# Patient Record
Sex: FEMALE | Race: BLACK OR AFRICAN AMERICAN | NOT HISPANIC OR LATINO | URBAN - METROPOLITAN AREA
[De-identification: names, ages, dates, MRNs, and addresses within clinical notes are randomized per-mention and may not be internally consistent; named-entity substitution may affect disease eponyms.]

---

## 2020-06-19 ENCOUNTER — INPATIENT (INPATIENT)
Facility: HOSPITAL | Age: 25
LOS: 1 days | Discharge: HOME | End: 2020-06-21
Attending: OBSTETRICS & GYNECOLOGY | Admitting: OBSTETRICS & GYNECOLOGY
Payer: MEDICAID

## 2020-06-19 DIAGNOSIS — O26.899 OTHER SPECIFIED PREGNANCY RELATED CONDITIONS, UNSPECIFIED TRIMESTER: ICD-10-CM

## 2020-06-19 DIAGNOSIS — Z34.80 ENCOUNTER FOR SUPERVISION OF OTHER NORMAL PREGNANCY, UNSPECIFIED TRIMESTER: ICD-10-CM

## 2020-06-19 DIAGNOSIS — Z3A.00 WEEKS OF GESTATION OF PREGNANCY NOT SPECIFIED: ICD-10-CM

## 2020-06-19 LAB
ALBUMIN SERPL ELPH-MCNC: 3.4 G/DL — SIGNIFICANT CHANGE UP (ref 3.3–5)
ALP SERPL-CCNC: 119 U/L — SIGNIFICANT CHANGE UP (ref 40–120)
ALT FLD-CCNC: 12 U/L — SIGNIFICANT CHANGE UP (ref 10–45)
ANION GAP SERPL CALC-SCNC: 12 MMOL/L — SIGNIFICANT CHANGE UP (ref 5–17)
APPEARANCE UR: CLEAR — SIGNIFICANT CHANGE UP
APTT BLD: 24.4 SEC — LOW (ref 27.5–36.3)
AST SERPL-CCNC: 14 U/L — SIGNIFICANT CHANGE UP (ref 10–40)
BACTERIA # UR AUTO: NEGATIVE — SIGNIFICANT CHANGE UP
BASOPHILS # BLD AUTO: 0 K/UL — SIGNIFICANT CHANGE UP (ref 0–0.2)
BASOPHILS NFR BLD AUTO: 0 % — SIGNIFICANT CHANGE UP (ref 0–2)
BILIRUB SERPL-MCNC: 0.2 MG/DL — SIGNIFICANT CHANGE UP (ref 0.2–1.2)
BILIRUB UR-MCNC: NEGATIVE — SIGNIFICANT CHANGE UP
BLD GP AB SCN SERPL QL: NEGATIVE — SIGNIFICANT CHANGE UP
BUN SERPL-MCNC: 9 MG/DL — SIGNIFICANT CHANGE UP (ref 7–23)
CALCIUM SERPL-MCNC: 9.5 MG/DL — SIGNIFICANT CHANGE UP (ref 8.4–10.5)
CHLORIDE SERPL-SCNC: 100 MMOL/L — SIGNIFICANT CHANGE UP (ref 96–108)
CO2 SERPL-SCNC: 22 MMOL/L — SIGNIFICANT CHANGE UP (ref 22–31)
COLOR SPEC: SIGNIFICANT CHANGE UP
CREAT ?TM UR-MCNC: 95 MG/DL — SIGNIFICANT CHANGE UP
CREAT SERPL-MCNC: 0.74 MG/DL — SIGNIFICANT CHANGE UP (ref 0.5–1.3)
DIFF PNL FLD: ABNORMAL
EOSINOPHIL # BLD AUTO: 0 K/UL — SIGNIFICANT CHANGE UP (ref 0–0.5)
EOSINOPHIL NFR BLD AUTO: 0 % — SIGNIFICANT CHANGE UP (ref 0–6)
EPI CELLS # UR: 4 /HPF — SIGNIFICANT CHANGE UP
FIBRINOGEN PPP-MCNC: 611 MG/DL — HIGH (ref 350–510)
GIANT PLATELETS BLD QL SMEAR: PRESENT — SIGNIFICANT CHANGE UP
GLUCOSE SERPL-MCNC: 107 MG/DL — HIGH (ref 70–99)
GLUCOSE UR QL: NEGATIVE — SIGNIFICANT CHANGE UP
HCT VFR BLD CALC: 33.8 % — LOW (ref 34.5–45)
HGB BLD-MCNC: 10.3 G/DL — LOW (ref 11.5–15.5)
HYALINE CASTS # UR AUTO: 1 /LPF — SIGNIFICANT CHANGE UP (ref 0–2)
INR BLD: 0.97 RATIO — SIGNIFICANT CHANGE UP (ref 0.88–1.16)
KETONES UR-MCNC: NEGATIVE — SIGNIFICANT CHANGE UP
LDH SERPL L TO P-CCNC: 174 U/L — SIGNIFICANT CHANGE UP (ref 50–242)
LEUKOCYTE ESTERASE UR-ACNC: ABNORMAL
LG PLATELETS BLD QL AUTO: SLIGHT — SIGNIFICANT CHANGE UP
LYMPHOCYTES # BLD AUTO: 1.19 K/UL — SIGNIFICANT CHANGE UP (ref 1–3.3)
LYMPHOCYTES # BLD AUTO: 9 % — LOW (ref 13–44)
MANUAL SMEAR VERIFICATION: SIGNIFICANT CHANGE UP
MCHC RBC-ENTMCNC: 24.8 PG — LOW (ref 27–34)
MCHC RBC-ENTMCNC: 30.5 GM/DL — LOW (ref 32–36)
MCV RBC AUTO: 81.3 FL — SIGNIFICANT CHANGE UP (ref 80–100)
MONOCYTES # BLD AUTO: 0.93 K/UL — HIGH (ref 0–0.9)
MONOCYTES NFR BLD AUTO: 7 % — SIGNIFICANT CHANGE UP (ref 2–14)
NEUTROPHILS # BLD AUTO: 10.87 K/UL — HIGH (ref 1.8–7.4)
NEUTROPHILS NFR BLD AUTO: 81 % — HIGH (ref 43–77)
NEUTS BAND # BLD: 1 % — SIGNIFICANT CHANGE UP (ref 0–8)
NITRITE UR-MCNC: NEGATIVE — SIGNIFICANT CHANGE UP
NRBC # BLD: 0 /100 — SIGNIFICANT CHANGE UP (ref 0–0)
PH UR: 6.5 — SIGNIFICANT CHANGE UP (ref 5–8)
PLAT MORPH BLD: ABNORMAL
PLATELET # BLD AUTO: 276 K/UL — SIGNIFICANT CHANGE UP (ref 150–400)
POTASSIUM SERPL-MCNC: 4.2 MMOL/L — SIGNIFICANT CHANGE UP (ref 3.5–5.3)
POTASSIUM SERPL-SCNC: 4.2 MMOL/L — SIGNIFICANT CHANGE UP (ref 3.5–5.3)
PROT ?TM UR-MCNC: 33 MG/DL — HIGH (ref 0–12)
PROT SERPL-MCNC: 6.7 G/DL — SIGNIFICANT CHANGE UP (ref 6–8.3)
PROT UR-MCNC: ABNORMAL
PROT/CREAT UR-RTO: 0.3 RATIO — HIGH (ref 0–0.2)
PROTHROM AB SERPL-ACNC: 11.1 SEC — SIGNIFICANT CHANGE UP (ref 10–12.9)
RBC # BLD: 4.16 M/UL — SIGNIFICANT CHANGE UP (ref 3.8–5.2)
RBC # FLD: 14.8 % — HIGH (ref 10.3–14.5)
RBC BLD AUTO: SIGNIFICANT CHANGE UP
RBC CASTS # UR COMP ASSIST: 37 /HPF — HIGH (ref 0–4)
RH IG SCN BLD-IMP: POSITIVE — SIGNIFICANT CHANGE UP
RH IG SCN BLD-IMP: POSITIVE — SIGNIFICANT CHANGE UP
SARS-COV-2 RNA SPEC QL NAA+PROBE: SIGNIFICANT CHANGE UP
SODIUM SERPL-SCNC: 134 MMOL/L — LOW (ref 135–145)
SP GR SPEC: 1.01 — SIGNIFICANT CHANGE UP (ref 1.01–1.02)
URATE SERPL-MCNC: 4.7 MG/DL — SIGNIFICANT CHANGE UP (ref 2.5–7)
UROBILINOGEN FLD QL: NEGATIVE — SIGNIFICANT CHANGE UP
VARIANT LYMPHS # BLD: 2 % — SIGNIFICANT CHANGE UP (ref 0–6)
WBC # BLD: 13.25 K/UL — HIGH (ref 3.8–10.5)
WBC # FLD AUTO: 13.25 K/UL — HIGH (ref 3.8–10.5)
WBC UR QL: 6 /HPF — HIGH (ref 0–5)

## 2020-06-19 RX ORDER — CITRIC ACID/SODIUM CITRATE 300-500 MG
15 SOLUTION, ORAL ORAL EVERY 6 HOURS
Refills: 0 | Status: DISCONTINUED | OUTPATIENT
Start: 2020-06-19 | End: 2020-06-20

## 2020-06-19 RX ORDER — OXYTOCIN 10 UNIT/ML
2 VIAL (ML) INJECTION
Qty: 30 | Refills: 0 | Status: DISCONTINUED | OUTPATIENT
Start: 2020-06-19 | End: 2020-06-21

## 2020-06-19 RX ORDER — OXYTOCIN 10 UNIT/ML
333.33 VIAL (ML) INJECTION
Qty: 20 | Refills: 0 | Status: DISCONTINUED | OUTPATIENT
Start: 2020-06-19 | End: 2020-06-21

## 2020-06-19 RX ORDER — SODIUM CHLORIDE 9 MG/ML
1000 INJECTION, SOLUTION INTRAVENOUS
Refills: 0 | Status: DISCONTINUED | OUTPATIENT
Start: 2020-06-19 | End: 2020-06-20

## 2020-06-20 VITALS — WEIGHT: 293 LBS | HEIGHT: 62 IN

## 2020-06-20 PROCEDURE — 59409 OBSTETRICAL CARE: CPT | Mod: U9

## 2020-06-20 RX ORDER — IBUPROFEN 200 MG
600 TABLET ORAL EVERY 6 HOURS
Refills: 0 | Status: COMPLETED | OUTPATIENT
Start: 2020-06-20 | End: 2021-05-19

## 2020-06-20 RX ORDER — DIPHENHYDRAMINE HCL 50 MG
25 CAPSULE ORAL EVERY 6 HOURS
Refills: 0 | Status: DISCONTINUED | OUTPATIENT
Start: 2020-06-20 | End: 2020-06-21

## 2020-06-20 RX ORDER — HYDROCORTISONE 1 %
1 OINTMENT (GRAM) TOPICAL EVERY 6 HOURS
Refills: 0 | Status: DISCONTINUED | OUTPATIENT
Start: 2020-06-20 | End: 2020-06-21

## 2020-06-20 RX ORDER — LANOLIN
1 OINTMENT (GRAM) TOPICAL EVERY 6 HOURS
Refills: 0 | Status: DISCONTINUED | OUTPATIENT
Start: 2020-06-20 | End: 2020-06-21

## 2020-06-20 RX ORDER — OXYCODONE HYDROCHLORIDE 5 MG/1
5 TABLET ORAL
Refills: 0 | Status: DISCONTINUED | OUTPATIENT
Start: 2020-06-20 | End: 2020-06-21

## 2020-06-20 RX ORDER — DIBUCAINE 1 %
1 OINTMENT (GRAM) RECTAL EVERY 6 HOURS
Refills: 0 | Status: DISCONTINUED | OUTPATIENT
Start: 2020-06-20 | End: 2020-06-21

## 2020-06-20 RX ORDER — BENZOCAINE 10 %
1 GEL (GRAM) MUCOUS MEMBRANE EVERY 6 HOURS
Refills: 0 | Status: DISCONTINUED | OUTPATIENT
Start: 2020-06-20 | End: 2020-06-21

## 2020-06-20 RX ORDER — SODIUM CHLORIDE 9 MG/ML
3 INJECTION INTRAMUSCULAR; INTRAVENOUS; SUBCUTANEOUS EVERY 8 HOURS
Refills: 0 | Status: DISCONTINUED | OUTPATIENT
Start: 2020-06-20 | End: 2020-06-21

## 2020-06-20 RX ORDER — OXYCODONE HYDROCHLORIDE 5 MG/1
5 TABLET ORAL ONCE
Refills: 0 | Status: DISCONTINUED | OUTPATIENT
Start: 2020-06-20 | End: 2020-06-21

## 2020-06-20 RX ORDER — MAGNESIUM HYDROXIDE 400 MG/1
30 TABLET, CHEWABLE ORAL
Refills: 0 | Status: DISCONTINUED | OUTPATIENT
Start: 2020-06-20 | End: 2020-06-21

## 2020-06-20 RX ORDER — ACETAMINOPHEN 500 MG
975 TABLET ORAL
Refills: 0 | Status: DISCONTINUED | OUTPATIENT
Start: 2020-06-20 | End: 2020-06-21

## 2020-06-20 RX ORDER — SIMETHICONE 80 MG/1
80 TABLET, CHEWABLE ORAL EVERY 4 HOURS
Refills: 0 | Status: DISCONTINUED | OUTPATIENT
Start: 2020-06-20 | End: 2020-06-21

## 2020-06-20 RX ORDER — PRAMOXINE HYDROCHLORIDE 150 MG/15G
1 AEROSOL, FOAM RECTAL EVERY 4 HOURS
Refills: 0 | Status: DISCONTINUED | OUTPATIENT
Start: 2020-06-20 | End: 2020-06-21

## 2020-06-20 RX ORDER — KETOROLAC TROMETHAMINE 30 MG/ML
30 SYRINGE (ML) INJECTION ONCE
Refills: 0 | Status: DISCONTINUED | OUTPATIENT
Start: 2020-06-20 | End: 2020-06-20

## 2020-06-20 RX ORDER — OXYTOCIN 10 UNIT/ML
333.33 VIAL (ML) INJECTION
Qty: 20 | Refills: 0 | Status: DISCONTINUED | OUTPATIENT
Start: 2020-06-20 | End: 2020-06-21

## 2020-06-20 RX ORDER — IBUPROFEN 200 MG
600 TABLET ORAL EVERY 6 HOURS
Refills: 0 | Status: DISCONTINUED | OUTPATIENT
Start: 2020-06-20 | End: 2020-06-21

## 2020-06-20 RX ORDER — AER TRAVELER 0.5 G/1
1 SOLUTION RECTAL; TOPICAL EVERY 4 HOURS
Refills: 0 | Status: DISCONTINUED | OUTPATIENT
Start: 2020-06-20 | End: 2020-06-21

## 2020-06-20 RX ORDER — TETANUS TOXOID, REDUCED DIPHTHERIA TOXOID AND ACELLULAR PERTUSSIS VACCINE, ADSORBED 5; 2.5; 8; 8; 2.5 [IU]/.5ML; [IU]/.5ML; UG/.5ML; UG/.5ML; UG/.5ML
0.5 SUSPENSION INTRAMUSCULAR ONCE
Refills: 0 | Status: DISCONTINUED | OUTPATIENT
Start: 2020-06-20 | End: 2020-06-21

## 2020-06-20 RX ADMIN — Medication 600 MILLIGRAM(S): at 17:24

## 2020-06-20 RX ADMIN — Medication 30 MILLIGRAM(S): at 08:51

## 2020-06-20 RX ADMIN — Medication 600 MILLIGRAM(S): at 23:30

## 2020-06-20 RX ADMIN — Medication 975 MILLIGRAM(S): at 21:32

## 2020-06-20 RX ADMIN — Medication 975 MILLIGRAM(S): at 14:42

## 2020-06-20 NOTE — PATIENT PROFILE OB - VISION (WITH CORRECTIVE LENSES IF THE PATIENT USUALLY WEARS THEM):
Your opinion matters! Thank you for choosing the [facility]. You might receive a survey in the mail about your experience today to evaluate our clinic. Please fill it out and return it in the pre-paid envelope.  It was a pleasure to care for you today!     Test Results:  Our goal is to report all test results ordered by our care provider within 7-14 days. If you do not receive your test results either by phone, myaurora or by mail within 14 days after your visit with our office please call our office at [phone] and ask to speak with a member of our care team.         Normal vision: sees adequately in most situations; can see medication labels, newsprint

## 2020-06-21 VITALS
SYSTOLIC BLOOD PRESSURE: 124 MMHG | DIASTOLIC BLOOD PRESSURE: 79 MMHG | HEART RATE: 98 BPM | OXYGEN SATURATION: 98 % | RESPIRATION RATE: 18 BRPM | TEMPERATURE: 98 F

## 2020-06-21 PROCEDURE — 85384 FIBRINOGEN ACTIVITY: CPT

## 2020-06-21 PROCEDURE — 81001 URINALYSIS AUTO W/SCOPE: CPT

## 2020-06-21 PROCEDURE — 87522 HEPATITIS C REVRS TRNSCRPJ: CPT

## 2020-06-21 PROCEDURE — 59025 FETAL NON-STRESS TEST: CPT

## 2020-06-21 PROCEDURE — 82570 ASSAY OF URINE CREATININE: CPT

## 2020-06-21 PROCEDURE — G0463: CPT

## 2020-06-21 PROCEDURE — 86900 BLOOD TYPING SEROLOGIC ABO: CPT

## 2020-06-21 PROCEDURE — 85610 PROTHROMBIN TIME: CPT

## 2020-06-21 PROCEDURE — 84156 ASSAY OF PROTEIN URINE: CPT

## 2020-06-21 PROCEDURE — 80053 COMPREHEN METABOLIC PANEL: CPT

## 2020-06-21 PROCEDURE — 83615 LACTATE (LD) (LDH) ENZYME: CPT

## 2020-06-21 PROCEDURE — 59050 FETAL MONITOR W/REPORT: CPT

## 2020-06-21 PROCEDURE — 86901 BLOOD TYPING SEROLOGIC RH(D): CPT

## 2020-06-21 PROCEDURE — 86850 RBC ANTIBODY SCREEN: CPT

## 2020-06-21 PROCEDURE — 85027 COMPLETE CBC AUTOMATED: CPT

## 2020-06-21 PROCEDURE — 85730 THROMBOPLASTIN TIME PARTIAL: CPT

## 2020-06-21 PROCEDURE — 84550 ASSAY OF BLOOD/URIC ACID: CPT

## 2020-06-21 RX ORDER — ACETAMINOPHEN 500 MG
3 TABLET ORAL
Qty: 0 | Refills: 0 | DISCHARGE
Start: 2020-06-21

## 2020-06-21 RX ORDER — IBUPROFEN 200 MG
1 TABLET ORAL
Qty: 0 | Refills: 0 | DISCHARGE
Start: 2020-06-21

## 2020-06-21 RX ADMIN — Medication 975 MILLIGRAM(S): at 03:03

## 2020-06-21 RX ADMIN — Medication 600 MILLIGRAM(S): at 05:41

## 2020-06-21 RX ADMIN — Medication 600 MILLIGRAM(S): at 12:00

## 2020-06-21 RX ADMIN — Medication 975 MILLIGRAM(S): at 15:43

## 2020-06-21 RX ADMIN — Medication 975 MILLIGRAM(S): at 08:46

## 2020-06-21 RX ADMIN — Medication 600 MILLIGRAM(S): at 18:08

## 2020-06-21 RX ADMIN — Medication 975 MILLIGRAM(S): at 21:00

## 2020-06-21 NOTE — PROGRESS NOTE ADULT - SUBJECTIVE AND OBJECTIVE BOX
OB Progress Note:  PPD#1    S: 25yo  PPD#1 s/p . Patient feels well. Pain is well controlled. She is tolerating a regular diet and passing flatus. She is voiding spontaneously, and ambulating without difficulty. Denies CP/SOB. Denies lightheadedness/dizziness. Denies N/V. Denies heavy vaginal bleeding, would like to use condoms for contraception.     O:  Vitals:  Vital Signs Last 24 Hrs  T(C): 36.6 (2020 04:58), Max: 37.1 (2020 08:55)  T(F): 97.8 (2020 04:58), Max: 98.8 (2020 08:55)  HR: 90 (2020 04:58) (76 - 149)  BP: 101/68 (2020 04:58) (101/68 - 156/83)  BP(mean): 95 (2020 08:55) (95 - 101)  RR: 18 (2020 04:58) (17 - 18)  SpO2: 98% (2020 04:58) (98% - 99%)    MEDICATIONS  (STANDING):  acetaminophen   Tablet .. 975 milliGRAM(s) Oral <User Schedule>  diphtheria/tetanus/pertussis (acellular) Vaccine (ADAcel) 0.5 milliLiter(s) IntraMuscular once  ibuprofen  Tablet. 600 milliGRAM(s) Oral every 6 hours  oxytocin Infusion 333.333 milliUNIT(s)/Min (1000 mL/Hr) IV Continuous <Continuous>  oxytocin Infusion 333.333 milliUNIT(s)/Min (1000 mL/Hr) IV Continuous <Continuous>  oxytocin Infusion 2 milliUNIT(s)/Min (2 mL/Hr) IV Continuous <Continuous>  prenatal multivitamin 1 Tablet(s) Oral daily  sodium chloride 0.9% lock flush 3 milliLiter(s) IV Push every 8 hours      Labs:  Blood type: O Positive  Rubella IgG: RPR:                           10.3<L>   13.25<H> >-----------< 276    (  @ 20:24 )             33.8<L>    - @ 20:24              Physical Exam:  General: NAD  Abdomen: soft, non-tender, non-distended, fundus firm  Vaginal: Lochia wnl  Extremities: No erythema/edema

## 2020-06-21 NOTE — DISCHARGE NOTE OB - PROVIDER TOKENS
FREE:[LAST:[[facility]],PHONE:[(   )    -],FAX:[(   )    -],ADDRESS:[[address] [place] [address] [address] [place] [address]  [phone]]]

## 2020-06-21 NOTE — PROGRESS NOTE ADULT - PROBLEM SELECTOR PLAN 1
- Pain well controlled, continue current pain regimen  - Increase ambulation, SCDs when not ambulating  - Continue regular diet    Jordin Peñaloza PGY1

## 2020-06-21 NOTE — DISCHARGE NOTE OB - MEDICATION SUMMARY - MEDICATIONS TO TAKE
I will START or STAY ON the medications listed below when I get home from the hospital:    Electronic blood pressure cuff   -- Apply on skin to affected area 2 times a day   -- Indication: For blood pressure monitoring    acetaminophen 325 mg oral tablet  -- 3 tab(s) by mouth   -- Indication: For pain    ibuprofen 600 mg oral tablet  -- 1 tab(s) by mouth every 6 hours  -- Indication: For pain

## 2020-06-21 NOTE — DISCHARGE NOTE OB - PLAN OF CARE
Recovery After discharge, please stay on pelvic rest for 6 weeks, meaning no sexual intercourse, no tampons and no douching.  No driving for 2 weeks as women can loose a lot of blood during delivery and there is a possibility of being lightheaded/fainting.  No lifting objects heavier than baby for two weeks.  Expect to have vaginal bleeding/spotting for up to six weeks.  The bleeding should get lighter and more white/light brown with time.  For bleeding soaking more than a pad an hour or passing clots greater than the size of your fist, come in to the emergency department. Blood pressure monitoring Continue to take your blood pressure at least twice a day, and take your medications as prescribed.  Call your OB if your pressures are greater than 150/100, or if you experience severe or persistent headaches, visual changes, persistent nausea/vomiting, trouble breathing, chest pain, or severe abdominal pain.

## 2020-06-21 NOTE — DISCHARGE NOTE OB - CARE PROVIDER_API CALL
Pulmonary Edema No Effusion/Grossly Normal Function [facility],   [address] [place] [address] [address]  [place] [address]  [phone]  Phone: (   )    -  Fax: (   )    -  Follow Up Time:

## 2020-06-21 NOTE — DISCHARGE NOTE OB - PATIENT PORTAL LINK FT
You can access the FollowMyHealth Patient Portal offered by [facility] by registering at the following website: [email or web address]. By joining [employer]’s FollowMyHealth portal, you will also be able to view your health information using other applications (apps) compatible with our system.

## 2020-06-21 NOTE — PROGRESS NOTE ADULT - ATTENDING COMMENTS
Att on Call  Pt seen and examined.  Feeling well  VSS afeb  Abd S NT ND FF min lochia  S/p , stable for dc home  Instruc reviewed  Fu 6 wks with her OB  DANA Watson

## 2020-06-21 NOTE — DISCHARGE NOTE OB - HOSPITAL COURSE
Patient had a vaginal delivery on 6/20/20, . Labor complicated by PEC without severe features. Blood pressures well controlled postpartum. HELLP labs wnl. Patient declined birth control. The patient met all appropriate post partum milestones and was discharged on PPD#1 afebrile, with stable vital signs, and appropriate pain control.

## 2020-06-21 NOTE — DISCHARGE NOTE OB - CARE PLAN
Principal Discharge DX:	 (normal spontaneous vaginal delivery)  Goal:	Recovery  Assessment and plan of treatment:	After discharge, please stay on pelvic rest for 6 weeks, meaning no sexual intercourse, no tampons and no douching.  No driving for 2 weeks as women can loose a lot of blood during delivery and there is a possibility of being lightheaded/fainting.  No lifting objects heavier than baby for two weeks.  Expect to have vaginal bleeding/spotting for up to six weeks.  The bleeding should get lighter and more white/light brown with time.  For bleeding soaking more than a pad an hour or passing clots greater than the size of your fist, come in to the emergency department.  Secondary Diagnosis:	Preeclampsia  Goal:	Blood pressure monitoring  Assessment and plan of treatment:	Continue to take your blood pressure at least twice a day, and take your medications as prescribed.  Call your OB if your pressures are greater than 150/100, or if you experience severe or persistent headaches, visual changes, persistent nausea/vomiting, trouble breathing, chest pain, or severe abdominal pain.

## 2020-06-22 LAB
HCV RNA SERPL NAA DL=5-ACNC: SIGNIFICANT CHANGE UP IU/ML
HCV RNA SPEC NAA+PROBE-LOG IU: SIGNIFICANT CHANGE UP LOG10IU/ML

## 2021-10-12 ENCOUNTER — OUTPATIENT (OUTPATIENT)
Dept: OUTPATIENT SERVICES | Facility: HOSPITAL | Age: 26
LOS: 1 days | End: 2021-10-12
Payer: MEDICAID

## 2021-10-12 VITALS — HEART RATE: 111 BPM | OXYGEN SATURATION: 98 %

## 2021-10-12 VITALS — SYSTOLIC BLOOD PRESSURE: 135 MMHG | TEMPERATURE: 99 F | HEART RATE: 86 BPM | DIASTOLIC BLOOD PRESSURE: 84 MMHG

## 2021-10-12 DIAGNOSIS — Z3A.00 WEEKS OF GESTATION OF PREGNANCY NOT SPECIFIED: ICD-10-CM

## 2021-10-12 DIAGNOSIS — O26.899 OTHER SPECIFIED PREGNANCY RELATED CONDITIONS, UNSPECIFIED TRIMESTER: ICD-10-CM

## 2021-10-12 PROCEDURE — G0463: CPT

## 2021-10-12 PROCEDURE — 59025 FETAL NON-STRESS TEST: CPT

## 2021-10-12 NOTE — OB PROVIDER TRIAGE NOTE - NSOBPROVIDERNOTE_OBGYN_ALL_OB_FT
26 yo  @37+3 coming in with contractions not in labor. Can be discharged home with routine OB prenatal care. Return precautions reviewed with patient including reg painful CTX, LOF, VB, or decreased FM.    - Fetus: Reactive NST, VTX. EFW approx 3600 by Leopold's.   - Prenatal issues: GDMA1, encourage FS    Patient discussed with attending physician, Dr. Jermaine Moise.    GIANNI Shelton, PGY1

## 2021-10-12 NOTE — OB RN TRIAGE NOTE - HEART RATE (BEATS/MIN)
103 Clindamycin Pregnancy And Lactation Text: This medication can be used in pregnancy if certain situations. Clindamycin is also present in breast milk.

## 2021-10-12 NOTE — OB PROVIDER TRIAGE NOTE - HISTORY OF PRESENT ILLNESS
HPI: 24yo F  @37+3 presents with contractions since 10 am q5min, 7/10 pain. +FM. -LOF.  -VB. Pt denies any other concerns.    PNC: GDMA1 diet controlled, has not checked FS in a few weeks. GBS swab done but result unknown.  EFW approx 3600 by Leopold's.    OBHx:  8#6   GynHx: denies hx STIs, fibroids, polyps, cysts, abnormal paps  PMH: Asthma, last hospitalized on steroids at age 12. No inhaler use during pregnancy. Never intubated. Denies hx clotting or bleeding disorders, HTN, DM  PSH: denies  PFH: no hx congential disorders, bleeding/clotting disorders  Psych: h/o postpartum depression  Social: denies etoh, smoking, drugs. Safe at home/in relationship.  Meds: PNV, Vit D  Allergies: NKDA  Will accept blood transfusions? Yes

## 2021-10-12 NOTE — OB PROVIDER TRIAGE NOTE - NSHPPHYSICALEXAM_GEN_ALL_CORE
Gen: NAD  CV: RRR  Pulm: breathing comfortably on RA  Abd: gravid, nontender  Extr: moving all extremities with ease  – VE: 0/0/-3  – Reactive NST: baseline 150, mod variability, +accels, -decels  – [place]: irregular  - sono: vertex

## 2021-10-17 ENCOUNTER — INPATIENT (INPATIENT)
Facility: HOSPITAL | Age: 26
LOS: 1 days | Discharge: HOME | End: 2021-10-19
Attending: OBSTETRICS & GYNECOLOGY | Admitting: OBSTETRICS & GYNECOLOGY
Payer: MEDICAID

## 2021-10-17 VITALS — DIASTOLIC BLOOD PRESSURE: 104 MMHG | HEART RATE: 109 BPM | SYSTOLIC BLOOD PRESSURE: 174 MMHG

## 2021-10-17 DIAGNOSIS — Z3A.00 WEEKS OF GESTATION OF PREGNANCY NOT SPECIFIED: ICD-10-CM

## 2021-10-17 DIAGNOSIS — O26.899 OTHER SPECIFIED PREGNANCY RELATED CONDITIONS, UNSPECIFIED TRIMESTER: ICD-10-CM

## 2021-10-17 DIAGNOSIS — Z34.80 ENCOUNTER FOR SUPERVISION OF OTHER NORMAL PREGNANCY, UNSPECIFIED TRIMESTER: ICD-10-CM

## 2021-10-17 LAB
ALBUMIN SERPL ELPH-MCNC: 3.6 G/DL — SIGNIFICANT CHANGE UP (ref 3.3–5)
ALP SERPL-CCNC: 120 U/L — SIGNIFICANT CHANGE UP (ref 40–120)
ALT FLD-CCNC: 15 U/L — SIGNIFICANT CHANGE UP (ref 10–45)
ANION GAP SERPL CALC-SCNC: 16 MMOL/L — SIGNIFICANT CHANGE UP (ref 5–17)
APPEARANCE UR: CLEAR — SIGNIFICANT CHANGE UP
APTT BLD: 25.8 SEC — LOW (ref 27.5–35.5)
AST SERPL-CCNC: 15 U/L — SIGNIFICANT CHANGE UP (ref 10–40)
BACTERIA # UR AUTO: ABNORMAL
BASOPHILS # BLD AUTO: 0.02 K/UL — SIGNIFICANT CHANGE UP (ref 0–0.2)
BASOPHILS NFR BLD AUTO: 0.2 % — SIGNIFICANT CHANGE UP (ref 0–2)
BILIRUB SERPL-MCNC: <0.1 MG/DL — LOW (ref 0.2–1.2)
BILIRUB UR-MCNC: NEGATIVE — SIGNIFICANT CHANGE UP
BLD GP AB SCN SERPL QL: NEGATIVE — SIGNIFICANT CHANGE UP
BUN SERPL-MCNC: 8 MG/DL — SIGNIFICANT CHANGE UP (ref 7–23)
CALCIUM SERPL-MCNC: 9.2 MG/DL — SIGNIFICANT CHANGE UP (ref 8.4–10.5)
CHLORIDE SERPL-SCNC: 103 MMOL/L — SIGNIFICANT CHANGE UP (ref 96–108)
CO2 SERPL-SCNC: 16 MMOL/L — LOW (ref 22–31)
COLOR SPEC: SIGNIFICANT CHANGE UP
COVID-19 SPIKE DOMAIN AB INTERP: POSITIVE
COVID-19 SPIKE DOMAIN ANTIBODY RESULT: 128 U/ML — HIGH
CREAT ?TM UR-MCNC: 60 MG/DL — SIGNIFICANT CHANGE UP
CREAT SERPL-MCNC: 0.58 MG/DL — SIGNIFICANT CHANGE UP (ref 0.5–1.3)
DIFF PNL FLD: ABNORMAL
EOSINOPHIL # BLD AUTO: 0.04 K/UL — SIGNIFICANT CHANGE UP (ref 0–0.5)
EOSINOPHIL NFR BLD AUTO: 0.4 % — SIGNIFICANT CHANGE UP (ref 0–6)
EPI CELLS # UR: 4 /HPF — SIGNIFICANT CHANGE UP
FIBRINOGEN PPP-MCNC: 652 MG/DL — HIGH (ref 290–520)
GLUCOSE BLDC GLUCOMTR-MCNC: 107 MG/DL — HIGH (ref 70–99)
GLUCOSE BLDC GLUCOMTR-MCNC: 108 MG/DL — HIGH (ref 70–99)
GLUCOSE BLDC GLUCOMTR-MCNC: 115 MG/DL — HIGH (ref 70–99)
GLUCOSE BLDC GLUCOMTR-MCNC: 118 MG/DL — HIGH (ref 70–99)
GLUCOSE BLDC GLUCOMTR-MCNC: 125 MG/DL — HIGH (ref 70–99)
GLUCOSE BLDC GLUCOMTR-MCNC: 93 MG/DL — SIGNIFICANT CHANGE UP (ref 70–99)
GLUCOSE BLDC GLUCOMTR-MCNC: 95 MG/DL — SIGNIFICANT CHANGE UP (ref 70–99)
GLUCOSE BLDC GLUCOMTR-MCNC: 99 MG/DL — SIGNIFICANT CHANGE UP (ref 70–99)
GLUCOSE BLDC GLUCOMTR-MCNC: 99 MG/DL — SIGNIFICANT CHANGE UP (ref 70–99)
GLUCOSE SERPL-MCNC: 117 MG/DL — HIGH (ref 70–99)
GLUCOSE UR QL: NEGATIVE — SIGNIFICANT CHANGE UP
HCT VFR BLD CALC: 38.1 % — SIGNIFICANT CHANGE UP (ref 34.5–45)
HGB BLD-MCNC: 11.9 G/DL — SIGNIFICANT CHANGE UP (ref 11.5–15.5)
HYALINE CASTS # UR AUTO: 0 /LPF — SIGNIFICANT CHANGE UP (ref 0–2)
IMM GRANULOCYTES NFR BLD AUTO: 1.6 % — HIGH (ref 0–1.5)
INR BLD: 0.92 RATIO — SIGNIFICANT CHANGE UP (ref 0.88–1.16)
KETONES UR-MCNC: NEGATIVE — SIGNIFICANT CHANGE UP
LDH SERPL L TO P-CCNC: 201 U/L — SIGNIFICANT CHANGE UP (ref 50–242)
LEUKOCYTE ESTERASE UR-ACNC: ABNORMAL
LYMPHOCYTES # BLD AUTO: 2.07 K/UL — SIGNIFICANT CHANGE UP (ref 1–3.3)
LYMPHOCYTES # BLD AUTO: 20.5 % — SIGNIFICANT CHANGE UP (ref 13–44)
MCHC RBC-ENTMCNC: 26.7 PG — LOW (ref 27–34)
MCHC RBC-ENTMCNC: 31.2 GM/DL — LOW (ref 32–36)
MCV RBC AUTO: 85.6 FL — SIGNIFICANT CHANGE UP (ref 80–100)
MONOCYTES # BLD AUTO: 0.98 K/UL — HIGH (ref 0–0.9)
MONOCYTES NFR BLD AUTO: 9.7 % — SIGNIFICANT CHANGE UP (ref 2–14)
NEUTROPHILS # BLD AUTO: 6.85 K/UL — SIGNIFICANT CHANGE UP (ref 1.8–7.4)
NEUTROPHILS NFR BLD AUTO: 67.6 % — SIGNIFICANT CHANGE UP (ref 43–77)
NITRITE UR-MCNC: NEGATIVE — SIGNIFICANT CHANGE UP
NRBC # BLD: 0 /100 WBCS — SIGNIFICANT CHANGE UP (ref 0–0)
PH UR: 7 — SIGNIFICANT CHANGE UP (ref 5–8)
PLATELET # BLD AUTO: 283 K/UL — SIGNIFICANT CHANGE UP (ref 150–400)
POTASSIUM SERPL-MCNC: 4.1 MMOL/L — SIGNIFICANT CHANGE UP (ref 3.5–5.3)
POTASSIUM SERPL-SCNC: 4.1 MMOL/L — SIGNIFICANT CHANGE UP (ref 3.5–5.3)
PROT ?TM UR-MCNC: 22 MG/DL — HIGH (ref 0–12)
PROT ?TM UR-MCNC: 23 MG/DL — HIGH (ref 0–12)
PROT SERPL-MCNC: 6.5 G/DL — SIGNIFICANT CHANGE UP (ref 6–8.3)
PROT UR-MCNC: ABNORMAL
PROT/CREAT UR-RTO: 0.4 RATIO — HIGH (ref 0–0.2)
PROTHROM AB SERPL-ACNC: 11.1 SEC — SIGNIFICANT CHANGE UP (ref 10.6–13.6)
RBC # BLD: 4.45 M/UL — SIGNIFICANT CHANGE UP (ref 3.8–5.2)
RBC # FLD: 16 % — HIGH (ref 10.3–14.5)
RBC CASTS # UR COMP ASSIST: 2 /HPF — SIGNIFICANT CHANGE UP (ref 0–4)
RH IG SCN BLD-IMP: POSITIVE — SIGNIFICANT CHANGE UP
SARS-COV-2 IGG+IGM SERPL QL IA: 128 U/ML — HIGH
SARS-COV-2 IGG+IGM SERPL QL IA: POSITIVE
SARS-COV-2 RNA SPEC QL NAA+PROBE: SIGNIFICANT CHANGE UP
SODIUM SERPL-SCNC: 135 MMOL/L — SIGNIFICANT CHANGE UP (ref 135–145)
SP GR SPEC: 1.01 — SIGNIFICANT CHANGE UP (ref 1.01–1.02)
URATE SERPL-MCNC: 4.2 MG/DL — SIGNIFICANT CHANGE UP (ref 2.5–7)
UROBILINOGEN FLD QL: NEGATIVE — SIGNIFICANT CHANGE UP
WBC # BLD: 10.12 K/UL — SIGNIFICANT CHANGE UP (ref 3.8–10.5)
WBC # FLD AUTO: 10.12 K/UL — SIGNIFICANT CHANGE UP (ref 3.8–10.5)
WBC UR QL: 9 /HPF — HIGH (ref 0–5)

## 2021-10-17 PROCEDURE — 59409 OBSTETRICAL CARE: CPT | Mod: U9

## 2021-10-17 PROCEDURE — 88307 TISSUE EXAM BY PATHOLOGIST: CPT | Mod: 26

## 2021-10-17 RX ORDER — MAGNESIUM SULFATE 500 MG/ML
4 VIAL (ML) INJECTION ONCE
Refills: 0 | Status: DISCONTINUED | OUTPATIENT
Start: 2021-10-17 | End: 2021-10-18

## 2021-10-17 RX ORDER — DIPHENHYDRAMINE HCL 50 MG
25 CAPSULE ORAL EVERY 6 HOURS
Refills: 0 | Status: DISCONTINUED | OUTPATIENT
Start: 2021-10-17 | End: 2021-10-19

## 2021-10-17 RX ORDER — OXYTOCIN 10 UNIT/ML
333.33 VIAL (ML) INJECTION
Qty: 20 | Refills: 0 | Status: DISCONTINUED | OUTPATIENT
Start: 2021-10-17 | End: 2021-10-19

## 2021-10-17 RX ORDER — OXYCODONE HYDROCHLORIDE 5 MG/1
5 TABLET ORAL ONCE
Refills: 0 | Status: DISCONTINUED | OUTPATIENT
Start: 2021-10-17 | End: 2021-10-19

## 2021-10-17 RX ORDER — TETANUS TOXOID, REDUCED DIPHTHERIA TOXOID AND ACELLULAR PERTUSSIS VACCINE, ADSORBED 5; 2.5; 8; 8; 2.5 [IU]/.5ML; [IU]/.5ML; UG/.5ML; UG/.5ML; UG/.5ML
0.5 SUSPENSION INTRAMUSCULAR ONCE
Refills: 0 | Status: DISCONTINUED | OUTPATIENT
Start: 2021-10-17 | End: 2021-10-19

## 2021-10-17 RX ORDER — ACETAMINOPHEN 500 MG
975 TABLET ORAL
Refills: 0 | Status: DISCONTINUED | OUTPATIENT
Start: 2021-10-17 | End: 2021-10-19

## 2021-10-17 RX ORDER — BENZOCAINE 10 %
1 GEL (GRAM) MUCOUS MEMBRANE EVERY 6 HOURS
Refills: 0 | Status: DISCONTINUED | OUTPATIENT
Start: 2021-10-17 | End: 2021-10-19

## 2021-10-17 RX ORDER — MAGNESIUM HYDROXIDE 400 MG/1
30 TABLET, CHEWABLE ORAL
Refills: 0 | Status: DISCONTINUED | OUTPATIENT
Start: 2021-10-17 | End: 2021-10-19

## 2021-10-17 RX ORDER — AER TRAVELER 0.5 G/1
1 SOLUTION RECTAL; TOPICAL EVERY 4 HOURS
Refills: 0 | Status: DISCONTINUED | OUTPATIENT
Start: 2021-10-17 | End: 2021-10-19

## 2021-10-17 RX ORDER — SODIUM CHLORIDE 9 MG/ML
1000 INJECTION, SOLUTION INTRAVENOUS
Refills: 0 | Status: DISCONTINUED | OUTPATIENT
Start: 2021-10-17 | End: 2021-10-18

## 2021-10-17 RX ORDER — PRAMOXINE HYDROCHLORIDE 150 MG/15G
1 AEROSOL, FOAM RECTAL EVERY 4 HOURS
Refills: 0 | Status: DISCONTINUED | OUTPATIENT
Start: 2021-10-17 | End: 2021-10-19

## 2021-10-17 RX ORDER — SIMETHICONE 80 MG/1
80 TABLET, CHEWABLE ORAL EVERY 4 HOURS
Refills: 0 | Status: DISCONTINUED | OUTPATIENT
Start: 2021-10-17 | End: 2021-10-19

## 2021-10-17 RX ORDER — OXYCODONE HYDROCHLORIDE 5 MG/1
5 TABLET ORAL
Refills: 0 | Status: DISCONTINUED | OUTPATIENT
Start: 2021-10-17 | End: 2021-10-19

## 2021-10-17 RX ORDER — SODIUM CHLORIDE 9 MG/ML
3 INJECTION INTRAMUSCULAR; INTRAVENOUS; SUBCUTANEOUS EVERY 8 HOURS
Refills: 0 | Status: DISCONTINUED | OUTPATIENT
Start: 2021-10-17 | End: 2021-10-19

## 2021-10-17 RX ORDER — HYDROCORTISONE 1 %
1 OINTMENT (GRAM) TOPICAL EVERY 6 HOURS
Refills: 0 | Status: DISCONTINUED | OUTPATIENT
Start: 2021-10-17 | End: 2021-10-19

## 2021-10-17 RX ORDER — OXYTOCIN 10 UNIT/ML
VIAL (ML) INJECTION
Qty: 20 | Refills: 0 | Status: DISCONTINUED | OUTPATIENT
Start: 2021-10-17 | End: 2021-10-19

## 2021-10-17 RX ORDER — IBUPROFEN 200 MG
600 TABLET ORAL EVERY 6 HOURS
Refills: 0 | Status: COMPLETED | OUTPATIENT
Start: 2021-10-17 | End: 2022-09-15

## 2021-10-17 RX ORDER — ACETAMINOPHEN 500 MG
650 TABLET ORAL EVERY 6 HOURS
Refills: 0 | Status: DISCONTINUED | OUTPATIENT
Start: 2021-10-17 | End: 2021-10-17

## 2021-10-17 RX ORDER — KETOROLAC TROMETHAMINE 30 MG/ML
30 SYRINGE (ML) INJECTION ONCE
Refills: 0 | Status: DISCONTINUED | OUTPATIENT
Start: 2021-10-17 | End: 2021-10-17

## 2021-10-17 RX ORDER — MAGNESIUM SULFATE 500 MG/ML
4 VIAL (ML) INJECTION ONCE
Refills: 0 | Status: COMPLETED | OUTPATIENT
Start: 2021-10-17 | End: 2021-10-18

## 2021-10-17 RX ORDER — OXYTOCIN 10 UNIT/ML
VIAL (ML) INJECTION
Qty: 30 | Refills: 0 | Status: DISCONTINUED | OUTPATIENT
Start: 2021-10-17 | End: 2021-10-18

## 2021-10-17 RX ORDER — OXYTOCIN 10 UNIT/ML
10 VIAL (ML) INJECTION ONCE
Refills: 0 | Status: COMPLETED | OUTPATIENT
Start: 2021-10-17 | End: 2021-10-17

## 2021-10-17 RX ORDER — MAGNESIUM SULFATE 500 MG/ML
2 VIAL (ML) INJECTION
Qty: 40 | Refills: 0 | Status: DISCONTINUED | OUTPATIENT
Start: 2021-10-17 | End: 2021-10-18

## 2021-10-17 RX ORDER — DIBUCAINE 1 %
1 OINTMENT (GRAM) RECTAL EVERY 6 HOURS
Refills: 0 | Status: DISCONTINUED | OUTPATIENT
Start: 2021-10-17 | End: 2021-10-19

## 2021-10-17 RX ORDER — CITRIC ACID/SODIUM CITRATE 300-500 MG
15 SOLUTION, ORAL ORAL EVERY 6 HOURS
Refills: 0 | Status: DISCONTINUED | OUTPATIENT
Start: 2021-10-17 | End: 2021-10-18

## 2021-10-17 RX ORDER — LANOLIN
1 OINTMENT (GRAM) TOPICAL EVERY 6 HOURS
Refills: 0 | Status: DISCONTINUED | OUTPATIENT
Start: 2021-10-17 | End: 2021-10-19

## 2021-10-17 RX ORDER — INFLUENZA VIRUS VACCINE 15; 15; 15; 15 UG/.5ML; UG/.5ML; UG/.5ML; UG/.5ML
0.5 SUSPENSION INTRAMUSCULAR ONCE
Refills: 0 | Status: COMPLETED | OUTPATIENT
Start: 2021-10-17 | End: 2021-10-17

## 2021-10-17 RX ORDER — SODIUM CHLORIDE 9 MG/ML
1000 INJECTION INTRAMUSCULAR; INTRAVENOUS; SUBCUTANEOUS
Refills: 0 | Status: DISCONTINUED | OUTPATIENT
Start: 2021-10-17 | End: 2021-10-18

## 2021-10-17 RX ADMIN — SODIUM CHLORIDE 125 MILLILITER(S): 9 INJECTION INTRAMUSCULAR; INTRAVENOUS; SUBCUTANEOUS at 10:30

## 2021-10-17 RX ADMIN — Medication 10 UNIT(S): at 22:32

## 2021-10-17 RX ADMIN — Medication 2 MILLIUNIT(S)/MIN: at 18:00

## 2021-10-17 RX ADMIN — SODIUM CHLORIDE 125 MILLILITER(S): 9 INJECTION, SOLUTION INTRAVENOUS at 12:40

## 2021-10-17 RX ADMIN — Medication 30 MILLIGRAM(S): at 23:32

## 2021-10-17 RX ADMIN — SODIUM CHLORIDE 125 MILLILITER(S): 9 INJECTION INTRAMUSCULAR; INTRAVENOUS; SUBCUTANEOUS at 16:21

## 2021-10-17 NOTE — OB RN DELIVERY SUMMARY - NS_SPECIMENS_OBGYN_ALL_OB
"  Leslie Turner is a 52 year old female who is being evaluated via a billable telephone visit.      The patient has been notified of following:     \"This telephone visit will be conducted via a call between you and your physician/provider. We have found that certain health care needs can be provided without the need for a physical exam.  This service lets us provide the care you need with a short phone conversation.  If a prescription is necessary we can send it directly to your pharmacy.  If lab work is needed we can place an order for that and you can then stop by our lab to have the test done at a later time.    If during the course of the call the physician/provider feels a telephone visit is not appropriate, you will not be charged for this service.\"     Patient has given verbal consent for Telephone visit?  Yes    Chief Complaint:    Chief Complaint   Patient presents with     Covid 19 Testing       HPI: Leslie Turner is an 52 year old female who calls with concerns that include exposure to COVID 19.    Told this morning that her aunt tested positive for COVID yesterday. They had an exposure 5 days ago, spent 2 hours with her.      ROS:      A 10 point ROS is negative except as expressed in HPI.    Past Medical History  Past Medical History:   Diagnosis Date     Backache, unspecified      Depressive disorder, not elsewhere classified      Dysmenorrhea      Esophageal reflux      Irritable bowel syndrome         Family History   Family History   Problem Relation Age of Onset     Diabetes Mother      Arthritis Mother      Cancer Maternal Grandmother      Alzheimer Disease Maternal Grandmother        Social History  Social History     Socioeconomic History     Marital status:      Spouse name: Not on file     Number of children: Not on file     Years of education: Not on file     Highest education level: Not on file   Occupational History     Not on file   Social Needs     Financial resource " strain: Not on file     Food insecurity     Worry: Not on file     Inability: Not on file     Transportation needs     Medical: Not on file     Non-medical: Not on file   Tobacco Use     Smoking status: Passive Smoke Exposure - Never Smoker     Tobacco comment: Patient quit in 1995,  smokes in car and outside   Substance and Sexual Activity     Alcohol use: Yes     Drug use: No     Sexual activity: Yes     Partners: Male   Lifestyle     Physical activity     Days per week: Not on file     Minutes per session: Not on file     Stress: Not on file   Relationships     Social connections     Talks on phone: Not on file     Gets together: Not on file     Attends Je[employer]'s witness service: Not on file     Active member of club or organization: Not on file     Attends meetings of clubs or organizations: Not on file     Relationship status: Not on file     Intimate partner violence     Fear of current or ex partner: Not on file     Emotionally abused: Not on file     Physically abused: Not on file     Forced sexual activity: Not on file   Other Topics Concern     Not on file   Social History Narrative     Not on file        Surgical History:  Past Surgical History:   Procedure Laterality Date     SURGICAL HISTORY OF -       HEMORRHOIDECTOMY     SURGICAL HISTORY OF -       ANAL FISTULA, SPHINCTEROTOMY     SURGICAL HISTORY OF -   2005    CARPAL TUNNEL RELEASE     SURGICAL HISTORY OF -       HERNIA     SURGICAL HISTORY OF -   1995    BTL     SURGICAL HISTORY OF -       hyst          Allergies:  Allergies   Allergen Reactions     Codeine Nausea and Vomiting     Dizziness, and fever        Current Meds:    Current Outpatient Medications:      AMOXICILLIN 875 MG OR TABS, 1 TABLET EVERY 12 HOURS, Disp: , Rfl:      DIFLUCAN 150 MG OR TABS, ONE TABLET FOR ONE DOSE, Disp: 1, Rfl: 0     FLAGYL 500 MG OR TABS, TAKE ONE TABLET 3 TIMES DAILY, Disp: 21, Rfl: 0     IBUPROFEN 800 MG OR TABS, 1 TABLET 3 TIMES DAILY PRN, Disp: 90, Rfl: 0      MUCINEX 600 MG OR TB12, 1 TABLET EVERY 12 HOURS AS NEEDED, Disp: , Rfl:      PREDNISONE 20 MG OR TABS, 1 TABLET DAILY, Disp: , Rfl:      PROTONIX 40 MG OR TBEC, ONE DAILY, Disp: 3 MONTHS, Rfl: 1 YEAR     SINEMET  MG OR TABS, 1 TABLET PO QHS for restless leg syndrome, Disp: 30, Rfl: 11     PHYSICAL EXAM:     Patient is alert and oriented. Able to speak in full sentences. No wheezing or cough heard during telephone conversation. Mood is appropriate.        ASSESSMENT:       ICD-10-CM    1. Exposure to SARS-associated coronavirus  Z20.828 Asymptomatic COVID-19 Virus (Coronavirus) by PCR         Worrisome symptoms discussed with instructions to go to the ED.  Patient verbalized understanding and agreed with this plan.     Alethea Carlos CNP  10/29/2020, 3:05 PM      Phone call duration:  11 minutes     Yes

## 2021-10-17 NOTE — OB PROVIDER LABOR PROGRESS NOTE - ASSESSMENT
24 yo  @38+3 GDMA1 poorly controlled re-examined by MFM fellow and found to be 5.5/60/-2.    - FHT Cat II -> Cat I after fetal scalp stimulation, will continue to monitor.  - GDMA1, FS 99 wnl, continue to monitor. Suspect macrosomia    d/w Dr. Danial Shelton, PGY1 26 yo  @38+3 GDMA1 poorly controlled re-examined by MFM fellow and found to be 5.5/60/-2.    - FHT Cat II -> Cat I after fetal scalp stimulation, will continue to monitor.  - GDMA1, FS 99 wnl, continue to monitor. Suspect macrosomia    d/w Dr. Danial Shelton, PGY1        OB  Addendum  Agree w/ above note by MD Shelton. 38w3d 5-6cm cat 2 tracing characterized by min to moderate variability w/ lates. +Scalp stim w/ return to cat 1 tracing after repositioning. Previously documented 9cm by resident however patients exam is actually 5-6/-2. Will cnt to monitor fetal/maternal status closely.    Juwan Barrow MD  OB

## 2021-10-17 NOTE — OB PROVIDER LABOR PROGRESS NOTE - ASSESSMENT
24 yo  GDMA1 diagnosed with PEC this admission now 9./-1    - PEC: /81, continue to monitor  - FHT Cat I, continue to monitor  - GDMA1  which is elevated, switch to NS and continue to monitor  - Continue with expectant management  - Expect     GIANNI Shelton, PGY1

## 2021-10-17 NOTE — OB PROVIDER LABOR PROGRESS NOTE - ASSESSMENT
26 yo  GDMA1 diagnosed with PEC this admission now /-3    - PEC: mild range BPs in 150s/60-70s, continue to monitor  - FHT Cat I, continue to monitor  - GDMA1 FS 95 which is appropriate, continue to monitor  - Continue with expectant management  - Expect     GIANNI Shelton, PGY1

## 2021-10-17 NOTE — OB PROVIDER H&P - ASSESSMENT
26 yo  @38+1 presenting in labor with newly diagnosed gHTN. Will admit to L&D    - Admission orders, IVF  - Newly diagnosed gHTN, f/u HELLP labs  - PNC c/b GDMA1, poorly controlled  - Consistent carb clears diet  - FHT Cat I, continue to monitor  - FS elevated at 123, will repeat and if >120 will start insulin gtt  - Expect     d/w Dr. Danial Shelton, PGY1

## 2021-10-17 NOTE — OB PROVIDER H&P - NSHPPHYSICALEXAM_GEN_ALL_CORE
Gen: NAD  CV: RRR  Pulm: breathing comfortably on RA  Abd: gravid, nontender  Extr: moving all extremities with ease  – VE: 0/0/-3  – Reactive NST: baseline 150, mod variability, +accels, -decels  – [place]: irregular  - sono: vertex Gen: NAD  CV: RRR  Pulm: breathing comfortably on RA  Abd: gravid, nontender  Extr: moving all extremities with ease  – VE: 4/30/-3  – FHT Cat I: baseline 150, mod variability, - accels, -decels  – [place]: q2 min  - sono: vertex

## 2021-10-17 NOTE — OB PROVIDER H&P - ATTENDING COMMENTS
26yo P1 at 38w1d preg c/b A1GDM, asthma, obesity, and now gHTN. Presenting in early labor, mild range BPs asxs. Will admit for labor mng, follow up PEC labs.       Juwan Barrow M.D. PGY-5  Maternal Fetal Medicine Fellow/Covering

## 2021-10-17 NOTE — OB PROVIDER LABOR PROGRESS NOTE - NS_ADDCERVICALEXAM_OBGYN_ALL_OB
Click to add…

## 2021-10-17 NOTE — OB RN DELIVERY SUMMARY - NS_SEPSISRSKCALC_OBGYN_ALL_OB_FT
EOS calculated successfully. EOS Risk Factor: 0.5/1000 live births ([facility] national incidence); GA=38w1d; Temp=99.14; ROM=11.4; GBS='Unknown'; Antibiotics='No antibiotics or any antibiotics < 2 hrs prior to birth'

## 2021-10-17 NOTE — OB PROVIDER LABOR PROGRESS NOTE - ASSESSMENT
Less than 200 MVU's measured with IUPC. Will continue to increase the pitocin     DW: Dr. Ruth Ann Naik, PGY-1
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
## 2021-10-17 NOTE — OB PROVIDER H&P - NSHPLABSRESULTS_GEN_ALL_CORE
T(C): --  HR: 111 (10-17-21 @ 10:11) (100 - 114)  BP: 153/91 (10-17-21 @ 10:11) (102/62 - 174/104)  RR: --  SpO2: 98% (10-17-21 @ 10:15) (91% - 100%)    CAPILLARY BLOOD GLUCOSE    123

## 2021-10-17 NOTE — OB PROVIDER LABOR PROGRESS NOTE - ASSESSMENT
26 yo  GDMA1 diagnosed with PEC this admission now /-2.    - s/p ISE and IUPC placement  - [facility] 75 which is insufficient: Augment with pitocin 2x2  - PEC: One severe range BP, repeat wnl, continue to monitor  - FHT Cat I, continue to monitor  - GDMA1 , wnl       GIANNI Shelton, PGY1

## 2021-10-17 NOTE — PRE-ANESTHESIA EVALUATION ADULT - NSANTHADDINFOFT_GEN_ALL_CORE
No cardiopulmonary problems. Morbidly obese, GDM, now w/ preeclampsia. Denies bleeding d/o. Denies back pain/ surgery/ issues. Informed consent obtained, including all R/B/A.

## 2021-10-17 NOTE — OB PROVIDER LABOR PROGRESS NOTE - NS_OBIHIFHRDETAILS_OBGYN_ALL_OB_FT
Cat II: 140/minimal to moderate variability/ - accels/ late decels -> improved with fetal stim to 140/mod variability/ - accels/ - decels
Cat I: 140/ mod variability/ - accels/ - decels
140/ mod variability/ - accels/ - intermittent early decels
Baseline 140 Moderate variability. +Accels - Decels
Baseline 140 Moderate variability. +Accels - Decels
Baseline 150 Moderate variability. +Accels +Early decelerations
Cat I: 130/mod variability/ + accels/ - decels
T Cat I: 150/mod variability/ - accels/ -decels

## 2021-10-17 NOTE — OB RN DELIVERY SUMMARY - NSSELHIDDEN_OBGYN_ALL_OB_FT
[NS_DeliveryAttending1_OBGYN_ALL_OB_FT:[id number]],[NS_DeliveryAssist1_OBGYN_ALL_OB_FT:[id number]],[NS_DeliveryAssist2_OBGYN_ALL_OB_FT:[id number]],[NS_DeliveryRN_OBGYN_ALL_OB_FT:[id number]]

## 2021-10-17 NOTE — OB PROVIDER LABOR PROGRESS NOTE - NSVAGINALEXAM_OBGYN_ALL_OB_DT
17-Oct-2021 20:18
17-Oct-2021 15:58
17-Oct-2021 17:51
17-Oct-2021 15:08
17-Oct-2021 19:16
17-Oct-2021 11:14
17-Oct-2021 13:50
17-Oct-2021 20:49
17-Oct-2021 21:37

## 2021-10-17 NOTE — OB PROVIDER DELIVERY SUMMARY - NSSELHIDDEN_OBGYN_ALL_OB_FT
[NS_DeliveryAttending1_OBGYN_ALL_OB_FT:[id number]],[NS_DeliveryAssist1_OBGYN_ALL_OB_FT:[id number]],[NS_DeliveryAssist2_OBGYN_ALL_OB_FT:[id number]]

## 2021-10-17 NOTE — OB PROVIDER DELIVERY SUMMARY - NSPROVIDERDELIVERYNOTE_OBGYN_ALL_OB_FT
Pt fully dilated and pushing.  of female infant in OA position. Head delivered easily. 1x nuchal noted- delivered through and reduced.  Infant to awaiting mother.  Spontaneous delivery of placenta. Fundal massage and Pitocin delivered. On inspection, rectum, cervix, sulcus, and perineum was intact.  Additional 10 IM of Pitocin and 1000 mcg of Cytotec was administered.  Excellent hemostasis was obtained. Mother and infant in stable condition.

## 2021-10-17 NOTE — OB PROVIDER LABOR PROGRESS NOTE - NS_SUBJECTIVE/OBJECTIVE_OBGYN_ALL_OB_FT
Patient examined at bedside by M fellow AGUSTIN Barrow and patient determined to be 5.5/60/-2. Patient uncomfortable but still refusing epidural.
Pt feeling rectal pressure and a strong urge to push
Patient uncomfortable feeling rectal pressure. She is now 9.5/90/-1 with anterior lip.
Pt feeling the urge to push
OB PA Progress Note    Pt seen and evaluated c/o increased rectal pressure.    Exam  VSS  SVE 8/80/-2  EFM 150bpm, moderate variability, +accels, -decels  [place] Q2-3min
Pt feeling rectal pressure.
Patient uncomfortable and feeling more rectal pressure. She is now 7/80/-3
Patient was AROMed at 11a. An ISE was also placed because FHT was difficult to trace despite maternal repositioning. When the ISE was placed patient was 5/60/-3.     ISE placement done by GEORGIA Brownlee, PGY4
ISE pulled out with patient repositioning, replaced. IUPC was put in because of difficulty monitoring contractions and patient is not able to feel them much now that she has an epidural.

## 2021-10-18 LAB
ALBUMIN SERPL ELPH-MCNC: 3.4 G/DL — SIGNIFICANT CHANGE UP (ref 3.3–5)
ALP SERPL-CCNC: 107 U/L — SIGNIFICANT CHANGE UP (ref 40–120)
ALT FLD-CCNC: 15 U/L — SIGNIFICANT CHANGE UP (ref 10–45)
ANION GAP SERPL CALC-SCNC: 19 MMOL/L — HIGH (ref 5–17)
APTT BLD: 23.1 SEC — LOW (ref 27.5–35.5)
AST SERPL-CCNC: 14 U/L — SIGNIFICANT CHANGE UP (ref 10–40)
BASOPHILS # BLD AUTO: 0 K/UL — SIGNIFICANT CHANGE UP (ref 0–0.2)
BASOPHILS NFR BLD AUTO: 0 % — SIGNIFICANT CHANGE UP (ref 0–2)
BILIRUB SERPL-MCNC: 0.2 MG/DL — SIGNIFICANT CHANGE UP (ref 0.2–1.2)
BUN SERPL-MCNC: 9 MG/DL — SIGNIFICANT CHANGE UP (ref 7–23)
CALCIUM SERPL-MCNC: 8.7 MG/DL — SIGNIFICANT CHANGE UP (ref 8.4–10.5)
CHLORIDE SERPL-SCNC: 105 MMOL/L — SIGNIFICANT CHANGE UP (ref 96–108)
CO2 SERPL-SCNC: 14 MMOL/L — LOW (ref 22–31)
CREAT SERPL-MCNC: 0.68 MG/DL — SIGNIFICANT CHANGE UP (ref 0.5–1.3)
EOSINOPHIL # BLD AUTO: 0 K/UL — SIGNIFICANT CHANGE UP (ref 0–0.5)
EOSINOPHIL NFR BLD AUTO: 0 % — SIGNIFICANT CHANGE UP (ref 0–6)
FIBRINOGEN PPP-MCNC: 614 MG/DL — HIGH (ref 290–520)
GLUCOSE BLDC GLUCOMTR-MCNC: 123 MG/DL — HIGH (ref 70–99)
GLUCOSE SERPL-MCNC: 120 MG/DL — HIGH (ref 70–99)
HCT VFR BLD CALC: 36 % — SIGNIFICANT CHANGE UP (ref 34.5–45)
HCV AB S/CO SERPL IA: 0.1 S/CO — SIGNIFICANT CHANGE UP (ref 0–0.99)
HCV AB SERPL-IMP: SIGNIFICANT CHANGE UP
HGB BLD-MCNC: 11.3 G/DL — LOW (ref 11.5–15.5)
INR BLD: 1.03 RATIO — SIGNIFICANT CHANGE UP (ref 0.88–1.16)
LDH SERPL L TO P-CCNC: 173 U/L — SIGNIFICANT CHANGE UP (ref 50–242)
LYMPHOCYTES # BLD AUTO: 0.56 K/UL — LOW (ref 1–3.3)
LYMPHOCYTES # BLD AUTO: 2.6 % — LOW (ref 13–44)
MAGNESIUM SERPL-MCNC: 3.9 MG/DL — HIGH (ref 1.6–2.6)
MAGNESIUM SERPL-MCNC: 4.4 MG/DL — HIGH (ref 1.6–2.6)
MAGNESIUM SERPL-MCNC: 4.4 MG/DL — HIGH (ref 1.6–2.6)
MANUAL SMEAR VERIFICATION: SIGNIFICANT CHANGE UP
MCHC RBC-ENTMCNC: 27 PG — SIGNIFICANT CHANGE UP (ref 27–34)
MCHC RBC-ENTMCNC: 31.4 GM/DL — LOW (ref 32–36)
MCV RBC AUTO: 85.9 FL — SIGNIFICANT CHANGE UP (ref 80–100)
MONOCYTES # BLD AUTO: 1.68 K/UL — HIGH (ref 0–0.9)
MONOCYTES NFR BLD AUTO: 7.8 % — SIGNIFICANT CHANGE UP (ref 2–14)
NEUTROPHILS # BLD AUTO: 19.34 K/UL — HIGH (ref 1.8–7.4)
NEUTROPHILS NFR BLD AUTO: 85.3 % — HIGH (ref 43–77)
NEUTS BAND # BLD: 4.3 % — SIGNIFICANT CHANGE UP (ref 0–8)
PLAT MORPH BLD: NORMAL — SIGNIFICANT CHANGE UP
PLATELET # BLD AUTO: 263 K/UL — SIGNIFICANT CHANGE UP (ref 150–400)
POLYCHROMASIA BLD QL SMEAR: SLIGHT — SIGNIFICANT CHANGE UP
POTASSIUM SERPL-MCNC: 4 MMOL/L — SIGNIFICANT CHANGE UP (ref 3.5–5.3)
POTASSIUM SERPL-SCNC: 4 MMOL/L — SIGNIFICANT CHANGE UP (ref 3.5–5.3)
PROT SERPL-MCNC: 6.1 G/DL — SIGNIFICANT CHANGE UP (ref 6–8.3)
PROTHROM AB SERPL-ACNC: 12.3 SEC — SIGNIFICANT CHANGE UP (ref 10.6–13.6)
RBC # BLD: 4.19 M/UL — SIGNIFICANT CHANGE UP (ref 3.8–5.2)
RBC # FLD: 16.2 % — HIGH (ref 10.3–14.5)
RBC BLD AUTO: NORMAL — SIGNIFICANT CHANGE UP
SODIUM SERPL-SCNC: 138 MMOL/L — SIGNIFICANT CHANGE UP (ref 135–145)
T PALLIDUM AB TITR SER: NEGATIVE — SIGNIFICANT CHANGE UP
URATE SERPL-MCNC: 5.4 MG/DL — SIGNIFICANT CHANGE UP (ref 2.5–7)
WBC # BLD: 21.58 K/UL — HIGH (ref 3.8–10.5)
WBC # FLD AUTO: 21.58 K/UL — HIGH (ref 3.8–10.5)

## 2021-10-18 RX ORDER — MAGNESIUM SULFATE 500 MG/ML
2 VIAL (ML) INJECTION
Qty: 40 | Refills: 0 | Status: DISCONTINUED | OUTPATIENT
Start: 2021-10-18 | End: 2021-10-19

## 2021-10-18 RX ORDER — IBUPROFEN 200 MG
600 TABLET ORAL EVERY 6 HOURS
Refills: 0 | Status: DISCONTINUED | OUTPATIENT
Start: 2021-10-18 | End: 2021-10-19

## 2021-10-18 RX ADMIN — SODIUM CHLORIDE 3 MILLILITER(S): 9 INJECTION INTRAMUSCULAR; INTRAVENOUS; SUBCUTANEOUS at 06:24

## 2021-10-18 RX ADMIN — Medication 600 MILLIGRAM(S): at 06:30

## 2021-10-18 RX ADMIN — Medication 300 GRAM(S): at 00:04

## 2021-10-18 RX ADMIN — Medication 50 GM/HR: at 00:04

## 2021-10-18 RX ADMIN — Medication 975 MILLIGRAM(S): at 20:26

## 2021-10-18 RX ADMIN — Medication 975 MILLIGRAM(S): at 04:10

## 2021-10-18 RX ADMIN — Medication 600 MILLIGRAM(S): at 18:11

## 2021-10-18 RX ADMIN — Medication 600 MILLIGRAM(S): at 07:00

## 2021-10-18 RX ADMIN — SODIUM CHLORIDE 3 MILLILITER(S): 9 INJECTION INTRAMUSCULAR; INTRAVENOUS; SUBCUTANEOUS at 13:56

## 2021-10-18 RX ADMIN — SODIUM CHLORIDE 3 MILLILITER(S): 9 INJECTION INTRAMUSCULAR; INTRAVENOUS; SUBCUTANEOUS at 20:25

## 2021-10-18 RX ADMIN — Medication 975 MILLIGRAM(S): at 03:40

## 2021-10-18 RX ADMIN — Medication 600 MILLIGRAM(S): at 18:55

## 2021-10-18 NOTE — PROGRESS NOTE ADULT - ASSESSMENT
A/P: 24yo PPD#1 s/p  c/b sPEC.  Patient is stable and doing well post-operatively.      #sPEC  - c/w Mg for seizure PPx (10/18-)  - monitor BPs  - HELLP labs wnl    #Postpartum  - Regular diet  - OOB, encourage ambulation  - Continue motrin, tylenol, oxycodone PRN for pain control.    - Micronor for contraception    Norah Myrick, PGY-3

## 2021-10-18 NOTE — PROGRESS NOTE ADULT - ATTENDING COMMENTS
Obstetrical  8am-6pm:  Patient seen and examined by me.  Agree with above resident note. On MgSO4 til 2230 tonight.  Keely MUNROE

## 2021-10-18 NOTE — CHART NOTE - NSCHARTNOTEFT_GEN_A_CORE
Patient seen for: nutrition consult for GDM postpartum education prior to discharge    Source: patient, EMR    Patient is a 26yo female PPD#1 s/p  complicated by sPEC  Admission date: 10/17  Antepartum course significant for GDMA1. Pt denies history of GDM with prior pregnancy.   Pt plans on breastfeeding infant.     Chart reviewed, events noted. Meds/labs reviewed.    Anthropometrics:  Height: 64 inches  Pre-pregnancy weight: 240 pounds   Weight gain: 51 pounds   Admit/Dosing wt: 291 pounds     Nutrition Diagnosis:   1) Food and Nutrition Related Knowledge Deficit related to knowledge of post-partum GDM nutrition recommendations as evidenced by PPD#1 s/p , antepartum course complicated by GDM, pt denies prior hx of GDM.   2) Overweight/Obesity related to prolonged excessive energy intake as evidenced by BMI >40.   Goal: Pt able to teach back 2 points of nutrition education provided.     Nutrition Intervention:  Post-partum GDM diet education provided to patient and significant other at bedside:   1) Increased risk of developing T2DM with GDM and ways to help prevent development  2) 4-12 week fasting oral glucose tolerance test follow-up as outpatient  3) General healthful diet and physical activity recommendations discussed  4) Increased energy needs with breastfeeding    After-delivery GDM education handout provided. Pt with no nutrition-related questions or concerns at this time. Pt and significant other made aware RD remains available.     Monitoring:  No other nutrition risks were identified during this encounter.  RD remains available upon request and will follow up per protocol.    Melissa Andrea MS CYNDI ATWOOD [facility] Pager #[phone]

## 2021-10-19 VITALS
SYSTOLIC BLOOD PRESSURE: 125 MMHG | TEMPERATURE: 98 F | DIASTOLIC BLOOD PRESSURE: 79 MMHG | HEART RATE: 98 BPM | RESPIRATION RATE: 18 BRPM | OXYGEN SATURATION: 98 %

## 2021-10-19 PROCEDURE — 80053 COMPREHEN METABOLIC PANEL: CPT

## 2021-10-19 PROCEDURE — 36415 COLL VENOUS BLD VENIPUNCTURE: CPT

## 2021-10-19 PROCEDURE — 83615 LACTATE (LD) (LDH) ENZYME: CPT

## 2021-10-19 PROCEDURE — 84550 ASSAY OF BLOOD/URIC ACID: CPT

## 2021-10-19 PROCEDURE — 87635 SARS-COV-2 COVID-19 AMP PRB: CPT

## 2021-10-19 PROCEDURE — 86850 RBC ANTIBODY SCREEN: CPT

## 2021-10-19 PROCEDURE — G0463: CPT

## 2021-10-19 PROCEDURE — 88307 TISSUE EXAM BY PATHOLOGIST: CPT

## 2021-10-19 PROCEDURE — 86769 SARS-COV-2 COVID-19 ANTIBODY: CPT

## 2021-10-19 PROCEDURE — 85384 FIBRINOGEN ACTIVITY: CPT

## 2021-10-19 PROCEDURE — 59025 FETAL NON-STRESS TEST: CPT

## 2021-10-19 PROCEDURE — 82962 GLUCOSE BLOOD TEST: CPT

## 2021-10-19 PROCEDURE — 90707 MMR VACCINE SC: CPT

## 2021-10-19 PROCEDURE — 86803 HEPATITIS C AB TEST: CPT

## 2021-10-19 PROCEDURE — 86900 BLOOD TYPING SEROLOGIC ABO: CPT

## 2021-10-19 PROCEDURE — 85610 PROTHROMBIN TIME: CPT

## 2021-10-19 PROCEDURE — 85025 COMPLETE CBC W/AUTO DIFF WBC: CPT

## 2021-10-19 PROCEDURE — 86901 BLOOD TYPING SEROLOGIC RH(D): CPT

## 2021-10-19 PROCEDURE — 81001 URINALYSIS AUTO W/SCOPE: CPT

## 2021-10-19 PROCEDURE — 83735 ASSAY OF MAGNESIUM: CPT

## 2021-10-19 PROCEDURE — 82570 ASSAY OF URINE CREATININE: CPT

## 2021-10-19 PROCEDURE — 86780 TREPONEMA PALLIDUM: CPT

## 2021-10-19 PROCEDURE — 84156 ASSAY OF PROTEIN URINE: CPT

## 2021-10-19 PROCEDURE — 85730 THROMBOPLASTIN TIME PARTIAL: CPT

## 2021-10-19 RX ORDER — NORETHINDRONE 0.35 MG/1
1 TABLET ORAL
Qty: 90 | Refills: 0
Start: 2021-10-19

## 2021-10-19 RX ADMIN — Medication 975 MILLIGRAM(S): at 09:29

## 2021-10-19 RX ADMIN — Medication 975 MILLIGRAM(S): at 08:29

## 2021-10-19 RX ADMIN — Medication 600 MILLIGRAM(S): at 11:41

## 2021-10-19 RX ADMIN — Medication 600 MILLIGRAM(S): at 12:28

## 2021-10-19 RX ADMIN — Medication 1 TABLET(S): at 11:41

## 2021-10-19 RX ADMIN — Medication 975 MILLIGRAM(S): at 15:55

## 2021-10-19 RX ADMIN — SODIUM CHLORIDE 3 MILLILITER(S): 9 INJECTION INTRAMUSCULAR; INTRAVENOUS; SUBCUTANEOUS at 13:04

## 2021-10-19 RX ADMIN — Medication 975 MILLIGRAM(S): at 02:22

## 2021-10-19 RX ADMIN — Medication 975 MILLIGRAM(S): at 14:58

## 2021-10-19 RX ADMIN — Medication 0.5 MILLILITER(S): at 15:39

## 2021-10-19 NOTE — DISCHARGE NOTE OB - MEDICATION SUMMARY - MEDICATIONS TO TAKE
I will START or STAY ON the medications listed below when I get home from the hospital:    acetaminophen 325 mg oral tablet  -- 3 tab(s) by mouth   -- Indication: For tylenol    ibuprofen 600 mg oral tablet  -- 1 tab(s) by mouth every 6 hours  -- Indication: For motrin    Prenatal Multivitamins with Folic Acid 1 mg oral tablet  -- 1 tab(s) by mouth once a day  -- Indication: For prenatals    norethindrone 0.35 mg oral tablet  -- 1 tab(s) by mouth once a day MDD:1 tab/day  -- Do not take this drug if you are pregnant.  It is very important that you take or use this exactly as directed.  Do not skip doses or discontinue unless directed by your doctor.    -- Indication: For contraception

## 2021-10-19 NOTE — DISCHARGE NOTE OB - PLAN OF CARE
Make your follow-up appointment with your doctor in 1 week for a blood pressure check/ in 6 weeks for a post-partum check.     No heavy lifting, driving, or strenuous activity for 6 weeks. Nothing per vagina such as tampons, intercourse, douches, or tub baths for 6 weeks or until you see your doctor. Call your doctor with any signs and symptoms of infection such as fever, chills, nausea, or vomiting. Call your doctor if you're unable to tolerate food, increase in vaginal bleeding, or have difficulty urinating. Call your doctor if you have pain that is not relieved by your prescribed medications. Notify your doctor with any other concerns.     Call [phone] if you have any of these concerns in the next 6 weeks. Make your follow-up appointment with your doctor in 1 week for a blood pressure check/ in 6 weeks for a post-partum check.     No heavy lifting, driving, or strenuous activity for 6 weeks. Nothing per vagina such as tampons, intercourse, douches, or tub baths for 6 weeks or until you see your doctor. Call your doctor with any signs and symptoms of infection such as fever, chills, nausea, or vomiting. Call your doctor if you're unable to tolerate food, increase in vaginal bleeding, or have difficulty urinating. Call your doctor if you have pain that is not relieved by your prescribed medications. Notify your doctor with any other concerns.     Call [phone] if you have any of these concerns in the next 6 weeks.    Call Doctor for blood pressure >150/100

## 2021-10-19 NOTE — DISCHARGE NOTE OB - CARE PLAN
1 Principal Discharge DX:	Vaginal delivery  Assessment and plan of treatment:	Make your follow-up appointment with your doctor in 1 week for a blood pressure check/ in 6 weeks for a post-partum check.     No heavy lifting, driving, or strenuous activity for 6 weeks. Nothing per vagina such as tampons, intercourse, douches, or tub baths for 6 weeks or until you see your doctor. Call your doctor with any signs and symptoms of infection such as fever, chills, nausea, or vomiting. Call your doctor if you're unable to tolerate food, increase in vaginal bleeding, or have difficulty urinating. Call your doctor if you have pain that is not relieved by your prescribed medications. Notify your doctor with any other concerns.     Call [phone] if you have any of these concerns in the next 6 weeks.   Principal Discharge DX:	Vaginal delivery  Assessment and plan of treatment:	Make your follow-up appointment with your doctor in 1 week for a blood pressure check/ in 6 weeks for a post-partum check.     No heavy lifting, driving, or strenuous activity for 6 weeks. Nothing per vagina such as tampons, intercourse, douches, or tub baths for 6 weeks or until you see your doctor. Call your doctor with any signs and symptoms of infection such as fever, chills, nausea, or vomiting. Call your doctor if you're unable to tolerate food, increase in vaginal bleeding, or have difficulty urinating. Call your doctor if you have pain that is not relieved by your prescribed medications. Notify your doctor with any other concerns.     Call [phone] if you have any of these concerns in the next 6 weeks.    Call Doctor for blood pressure >150/100

## 2021-10-19 NOTE — DISCHARGE NOTE OB - PATIENT PORTAL LINK FT
You can access the FollowMyHealth Patient Portal offered by [facility] by registering at the following website: [email or web address]. By joining [employer]’s FollowMyHealth portal, you will also be able to view your health information using other applications (apps) compatible with our system.

## 2021-10-19 NOTE — PROGRESS NOTE ADULT - SUBJECTIVE AND OBJECTIVE BOX
OB Progress Note    S: Patient seen and examined at bedside. Pain well controlled, tolerating regular diet, passing flatus, ambulating without difficulty, voiding spontaneously. Denies heavy vaginal bleeding, N/V, CP/SOB/lightheadedness/dizziness, headache, visual disturbances, epigastric pain.     O:   Vital Signs Last 24 Hrs  T(C): 37.5 (18 Oct 2021 02:14), Max: 37.5 (18 Oct 2021 02:14)  T(F): 99.5 (18 Oct 2021 02:14), Max: 99.5 (18 Oct 2021 02:14)  HR: 101 (18 Oct 2021 02:14) (70 - 128)  BP: 116/75 (18 Oct 2021 02:14) (92/52 - 174/104)  BP(mean): --  RR: 18 (18 Oct 2021 02:14) (16 - 22)  SpO2: 99% (18 Oct 2021 02:14) (80% - 100%)    Labs:  Blood type: O Positive  Rubella IgG: RPR:                           11.3<L>   21.58<H> >-----------< 263    ( 10-18 @ 00:16 )             36.0                        11.9   10.12 >-----------< 283    ( 10-17 @ 10:43 )             38.1    10-18-21 @ 00:16      138  |  105  |  9   ----------------------------<  120<H>  4.0   |  14<L>  |  0.68    10-17-21 @ 10:43      135  |  103  |  8   ----------------------------<  117<H>  4.1   |  16<L>  |  0.58        Ca    8.7      18 Oct 2021 00:16  Ca    9.2      17 Oct 2021 10:43    TPro  6.1  /  Alb  3.4  /  TBili  0.2  /  DBili  x   /  AST  14  /  ALT  15  /  AlkPhos  107  10-18-21 @ 00:16  TPro  6.5  /  Alb  3.6  /  TBili  <0.1<L>  /  DBili  x   /  AST  15  /  ALT  15  /  AlkPhos  120  10-17-21 @ 10:43          PE:  General: NAD  Abdomen: nontender nondistended, Fundus firm  : No heavy vaginal bleeding  Extremities: Nontender, no pitting edema    
OB Progress Note    S: Patient seen and examined at bedside. Pain well controlled, tolerating regular diet, passing flatus, ambulating without difficulty, voiding spontaneously. Denies heavy vaginal bleeding, N/V, CP/SOB/lightheadedness/dizziness, headache, visual disturbances, epigastric pain.     O:   Vital Signs Last 24 Hrs  T(C): 36.8 (19 Oct 2021 00:56), Max: 36.8 (18 Oct 2021 10:30)  T(F): 98.2 (19 Oct 2021 00:56), Max: 98.3 (18 Oct 2021 10:30)  HR: 107 (19 Oct 2021 00:56) (85 - 107)  BP: 137/83 (19 Oct 2021 00:56) (112/75 - 138/91)  BP(mean): --  RR: 18 (19 Oct 2021 00:56) (18 - 18)  SpO2: 98% (19 Oct 2021 00:56) (97% - 100%)    Labs:  Blood type: O Positive  Rubella IgG: RPR: Negative                          11.3<L>   21.58<H> >-----------< 263    ( 10-18 @ 00:16 )             36.0                        11.9   10.12 >-----------< 283    ( 10-17 @ 10:43 )             38.1    10-18-21 @ 00:16      138  |  105  |  9   ----------------------------<  120<H>  4.0   |  14<L>  |  0.68    10-17-21 @ 10:43      135  |  103  |  8   ----------------------------<  117<H>  4.1   |  16<L>  |  0.58        Ca    8.7      18 Oct 2021 00:16  Ca    9.2      17 Oct 2021 10:43  Mg     4.4<H>     10-18  Mg     4.4<H>     10-18  Mg     3.9<H>     10-18    TPro  6.1  /  Alb  3.4  /  TBili  0.2  /  DBili  x   /  AST  14  /  ALT  15  /  AlkPhos  107  10-18-21 @ 00:16  TPro  6.5  /  Alb  3.6  /  TBili  <0.1<L>  /  DBili  x   /  AST  15  /  ALT  15  /  AlkPhos  120  10-17-21 @ 10:43          PE:  General: NAD  Abdomen: nontender nondistended, Fundus firm  : No heavy vaginal bleeding  Extremities: Nontender, no pitting edema

## 2021-10-19 NOTE — DISCHARGE NOTE OB - HOSPITAL COURSE
Patient was admitted to L+D, Pt had an uncomplicated  followed by an complicated by PEC w/ SF. MgSO4 for seizure ppx.   On Postpartum day 2, patient was discharged home in stable condition, voiding spontaneously, pain well controlled, ambulating, tolerating PO and with normal vital signs. Patient's postpartum birth control plan is micronor. Pt [facility]. Telephone number and clinic information provided prior to discharge.

## 2021-10-19 NOTE — PROGRESS NOTE ADULT - ASSESSMENT
A/P: 24yo PPD#1 s/p  c/b sPEC.  Patient is stable and doing well post-operatively.      #sPEC  - Mg for seizure PPx (10/18-)  - BPs well controlled without standing antihypertensives  - monitor BPs  - HELLP labs wnl    #Postpartum  - Regular diet  - OOB, encourage ambulation  - Continue motrin, tylenol, oxycodone PRN for pain control.    - Contraception: Micronor    Norah Diamant, PGY-3

## 2021-11-15 LAB — SURGICAL PATHOLOGY STUDY: SIGNIFICANT CHANGE UP

## 2023-07-07 NOTE — OB RN PATIENT PROFILE - AS TEMP SITE
Render Risk Assessment In Note?: no
Additional Notes: Recommend patient keep area dry and moisturize the area with a fragrance free product such as eucerin
Detail Level: Simple
oral

## 2023-11-19 NOTE — OB PROVIDER DELIVERY SUMMARY - NSVAGINALEXAMCERT_OBGYN_ALL_OB
The Delivery OB Provider certifies that vaginal examination and/or abdominal examination after the delivery was done and no foreign body was found. Regular Contractions

## 2024-05-28 NOTE — PROGRESS NOTE ADULT - NUTRITIONAL ASSESSMENT
This patient has been assessed with a concern for Malnutrition and has been determined to have a diagnosis/diagnoses of Morbid obesity (BMI > 40).    This patient is being managed with:   Diet Regular-  No Shellfish  Entered: Oct 17 2021 10:45PM     7 (severe pain)

## 2025-04-16 NOTE — PROVIDER CONTACT NOTE (OTHER) - ACTION/TREATMENT ORDERED:
Time reflects when diagnosis was documented in both MDM as applicable and the Disposition within this note       Time User Action Codes Description Comment    4/16/2025  1:49 PM Jerri Baker [K08.89] Dentalgia     4/16/2025  1:49 PM Jerri Baker [K04.7] Dental infection     4/16/2025  1:49 PM Jerri Baker [K02.9] Dental caries           ED Disposition       ED Disposition   Discharge    Condition   Stable    Date/Time   Wed Apr 16, 2025  1:49 PM    Comment   Rose Sarabia discharge to home/self care.                   Assessment & Plan       Medical Decision Making  Left lower third molar pain probably secondary to partial eruption no obvious signs of abscess but will cover placed on anti-inflammatory follow with dentist no airway compromise no Ludewig's    Risk  Prescription drug management.             Medications   ibuprofen (MOTRIN) tablet 800 mg (has no administration in time range)   penicillin V potassium (VEETID) tablet 500 mg (has no administration in time range)       ED Risk Strat Scores                    No data recorded        SBIRT 20yo+      Flowsheet Row Most Recent Value   Initial Alcohol Screen: US AUDIT-C     1. How often do you have a drink containing alcohol? 0 Filed at: 04/16/2025 1323   2. How many drinks containing alcohol do you have on a typical day you are drinking?  0 Filed at: 04/16/2025 1323   3b. FEMALE Any Age, or MALE 65+: How often do you have 4 or more drinks on one occassion? 0 Filed at: 04/16/2025 1323   Audit-C Score 0 Filed at: 04/16/2025 1323   OSCAR: How many times in the past year have you...    Used an illegal drug or used a prescription medication for non-medical reasons? Never Filed at: 04/16/2025 1323                            History of Present Illness       Chief Complaint   Patient presents with    Dental Pain     Left side dental pain for approx 3 months, has not seen a dentist, has an appointment for next week.        Past Medical History:   Diagnosis  Date    Depression with anxiety     Epilepsy (HCC)     Leukoplakia of tongue 2017    Migraines       Past Surgical History:   Procedure Laterality Date     SECTION      DILATION AND EVACUATION      surgically induced     TUBAL LIGATION        Family History   Problem Relation Age of Onset    Other Mother         abondonment     Bipolar disorder Mother     Other Father         abondonment     Other Sister         abondonment     ADD / ADHD Son         predominantly hyperactive-impulsive     Asperger's syndrome Son       Social History     Tobacco Use    Smoking status: Every Day     Current packs/day: 0.50     Average packs/day: 0.5 packs/day for 15.0 years (7.5 ttl pk-yrs)     Types: Cigarettes    Smokeless tobacco: Never    Tobacco comments:     (tobacco use, Every day smoker: 10/day x 24 yrs.- as per [employer])   Vaping Use    Vaping status: Never Used   Substance Use Topics    Alcohol use: No    Drug use: Not Currently     Types: Marijuana      E-Cigarette/Vaping    E-Cigarette Use Never User       E-Cigarette/Vaping Substances      I have reviewed and agree with the history as documented.     Patient with 3 months of left lower molar pain not seen a dentist she has she had leftover antibiotics she does not know what antibiotic was she tried that few weeks back but the pain still continues she denies any difficulty breathing or swallowing or fever      Dental Pain  Associated symptoms: no fever        Review of Systems   Constitutional:  Negative for chills and fever.   HENT:  Negative for ear pain and sore throat.    Eyes:  Negative for redness.   Respiratory:  Negative for shortness of breath.    Cardiovascular:  Negative for chest pain.   Gastrointestinal:  Negative for vomiting.   Skin:  Negative for color change.   Neurological:  Negative for seizures and syncope.   All other systems reviewed and are negative.          Objective       ED Triage Vitals [25 1325]   Temperature  Pulse Blood Pressure Respirations SpO2 Patient Position - Orthostatic VS   97.6 °F (36.4 °C) 101 128/85 20 99 % --      Temp Source Heart Rate Source BP Location FiO2 (%) Pain Score    Oral -- -- -- --      Vitals      Date and Time Temp Pulse SpO2 Resp BP Pain Score FACES Pain Rating User   04/16/25 1325 97.6 °F (36.4 °C) 101 99 % 20 128/85 -- -- JR            Physical Exam  Vitals and nursing note reviewed.   Constitutional:       General: She is not in acute distress.     Appearance: She is well-developed. She is not ill-appearing, toxic-appearing or diaphoretic.   HENT:      Head: Normocephalic and atraumatic.      Mouth/Throat:      Mouth: Mucous membranes are moist.      Pharynx: No oropharyngeal exudate or posterior oropharyngeal erythema.      Comments: Left lower third molar partial eruption really no swelling to the gumline mild tenderness palpation of the tooth  Eyes:      Conjunctiva/sclera: Conjunctivae normal.   Cardiovascular:      Rate and Rhythm: Normal rate and regular rhythm.      Heart sounds: No murmur heard.  Pulmonary:      Effort: Pulmonary effort is normal. No respiratory distress.      Breath sounds: Normal breath sounds.   Abdominal:      Palpations: Abdomen is soft.      Tenderness: There is no abdominal tenderness.   Musculoskeletal:         General: No swelling.      Cervical back: Neck supple.   Skin:     General: Skin is warm and dry.      Capillary Refill: Capillary refill takes less than 2 seconds.      Comments: Plaque-like psoriasis on extremities   Neurological:      Mental Status: She is alert.   Psychiatric:         Mood and Affect: Mood normal.         Results Reviewed       None            No orders to display       Procedures    ED Medication and Procedure Management   Prior to Admission Medications   Prescriptions Last Dose Informant Patient Reported? Taking?   Diclofenac Sodium (VOLTAREN) 1 %   No No   Sig: Apply 2 g topically 4 (four) times a day   acetaminophen (TYLENOL)  500 mg tablet   No No   Sig: Take 1 tablet (500 mg total) by mouth every 6 (six) hours as needed for mild pain   albuterol (PROVENTIL HFA,VENTOLIN HFA) 90 mcg/act inhaler   No No   Sig: Inhale 2 puffs every 4 (four) hours as needed for wheezing   benzonatate (TESSALON PERLES) 100 mg capsule   No No   Sig: Take 1 capsule (100 mg total) by mouth 3 (three) times a day as needed for cough   clobetasol (TEMOVATE) 0.05 % cream   No No   Sig: Apply topically 2 (two) times a day   ergocalciferol (VITAMIN D2) 50,000 units   No No   Sig: Take 1 capsule (50,000 Units total) by mouth once a week   fluticasone (FLONASE) 50 mcg/act nasal spray   No No   Si spray into each nostril daily   hydrOXYzine HCL (ATARAX) 25 mg tablet   No No   Sig: Take 1 tablet (25 mg total) by mouth every 6 (six) hours   ibuprofen (MOTRIN) 400 mg tablet   No No   Sig: Take 1 tablet (400 mg total) by mouth every 6 (six) hours as needed for mild pain   menthol-cetylpyridinium (CEPACOL) 3 MG lozenge   No No   Sig: Take 1 lozenge (3 mg total) by mouth as needed for sore throat   naproxen (NAPROSYN) 250 mg tablet   No No   Sig: Take 1 tablet (250 mg total) by mouth 3 (three) times a day with meals for 7 days   naproxen (NAPROSYN) 500 mg tablet   No No   Sig: Take 1 tablet (500 mg total) by mouth 2 (two) times a day with meals   ondansetron (ZOFRAN) 4 mg tablet   No No   Sig: Take 1 tablet (4 mg total) by mouth every 8 (eight) hours as needed for nausea or vomiting for up to 7 doses   triamcinolone (KENALOG) 0.025 % cream   No No   Sig: Apply topically 2 (two) times a day   venlafaxine (EFFEXOR-XR) 37.5 mg 24 hr capsule   No No   Sig: Take 1 capsule (37.5 mg total) by mouth daily with breakfast      Facility-Administered Medications: None     Patient's Medications   Discharge Prescriptions    IBUPROFEN (MOTRIN) 800 MG TABLET    Take 1 tablet (800 mg total) by mouth 3 (three) times a day       Start Date: 2025 End Date: --       Order Dose: 800 mg        Quantity: 21 tablet    Refills: 0    PENICILLIN V POTASSIUM (VEETID) 500 MG TABLET    Take 1 tablet (500 mg total) by mouth 4 (four) times a day for 7 days       Start Date: 4/16/2025 End Date: 4/23/2025       Order Dose: 500 mg       Quantity: 28 tablet    Refills: 0     No discharge procedures on file.  ED SEPSIS DOCUMENTATION   Time reflects when diagnosis was documented in both MDM as applicable and the Disposition within this note       Time User Action Codes Description Comment    4/16/2025  1:49 PM Jerri Baker [K08.89] Dentalgia     4/16/2025  1:49 PM Jerri Baker [K04.7] Dental infection     4/16/2025  1:49 PM Jerri Baker [K02.9] Dental caries                  Alec Darden MD  04/16/25 [id number]     instruct patient to report nurse of any discomfort or symptoms, continue current plan. instruct patient to report nurse of any discomfort or symptoms, continue current plan as per Dr. Peñaloza. Estimated Blood Loss (Cc): minimal